# Patient Record
Sex: MALE | URBAN - NONMETROPOLITAN AREA
[De-identification: names, ages, dates, MRNs, and addresses within clinical notes are randomized per-mention and may not be internally consistent; named-entity substitution may affect disease eponyms.]

---

## 2024-06-12 ENCOUNTER — OFFICE VISIT (OUTPATIENT)
Dept: URBAN - NONMETROPOLITAN AREA CLINIC 4 | Facility: CLINIC | Age: 54
End: 2024-06-12

## 2024-08-21 ENCOUNTER — OFFICE VISIT (OUTPATIENT)
Dept: URBAN - NONMETROPOLITAN AREA CLINIC 4 | Facility: CLINIC | Age: 54
End: 2024-08-21
Payer: COMMERCIAL

## 2024-08-21 ENCOUNTER — TELEPHONE ENCOUNTER (OUTPATIENT)
Dept: URBAN - METROPOLITAN AREA CLINIC 54 | Facility: CLINIC | Age: 54
End: 2024-08-21

## 2024-08-21 ENCOUNTER — DASHBOARD ENCOUNTERS (OUTPATIENT)
Age: 54
End: 2024-08-21

## 2024-08-21 VITALS
HEART RATE: 73 BPM | DIASTOLIC BLOOD PRESSURE: 105 MMHG | WEIGHT: 216 LBS | HEIGHT: 74 IN | TEMPERATURE: 98.6 F | BODY MASS INDEX: 27.72 KG/M2 | SYSTOLIC BLOOD PRESSURE: 156 MMHG

## 2024-08-21 DIAGNOSIS — K76.0 METABOLIC DYSFUNCTION-ASSOCIATED STEATOTIC LIVER DISEASE (MASLD): ICD-10-CM

## 2024-08-21 PROCEDURE — 99204 OFFICE O/P NEW MOD 45 MIN: CPT | Performed by: INTERNAL MEDICINE

## 2024-08-21 RX ORDER — LEVOTHYROXINE SODIUM 88 UG/1
1 TABLET IN THE MORNING ON AN EMPTY STOMACH TABLET ORAL ONCE A DAY
Status: ACTIVE | COMMUNITY

## 2024-08-21 RX ORDER — TESTOSTERONE 16.2 MG/G
1 PACKET TO SKIN IN THE MORNING TO SHOULDER AND UPPER ARMS GEL TRANSDERMAL ONCE A DAY
Status: ACTIVE | COMMUNITY

## 2024-08-21 RX ORDER — SEMAGLUTIDE 1 MG/.5ML
0.5 ML INJECTION, SOLUTION SUBCUTANEOUS
Status: ACTIVE | COMMUNITY

## 2024-08-21 NOTE — HPI-TODAY'S VISIT:
Patient is a 55 yo man referred by Dr. Timothy Hodge for above reasons. A copy of this document will be sent to referring provider. He carries a diagnosis of MASLD. Risk factors include HLD, HTN, obesity. He denies abuse of acohol or tobacco. He has a family history of fatty liver or cirrhosis. LFTs on 6/3/24: TB 1.0, AST 48, ALT 73, AP 81. . Fibrosure 0.7. He has never had a liver biopsy. He has been on Wegovy 1 mg x 6 months with 25 lbs weight loss achieved. 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

## 2025-02-18 ENCOUNTER — LAB OUTSIDE AN ENCOUNTER (OUTPATIENT)
Dept: URBAN - NONMETROPOLITAN AREA CLINIC 4 | Facility: CLINIC | Age: 55
End: 2025-02-18

## 2025-02-18 ENCOUNTER — TELEPHONE ENCOUNTER (OUTPATIENT)
Dept: URBAN - NONMETROPOLITAN AREA CLINIC 4 | Facility: CLINIC | Age: 55
End: 2025-02-18

## 2025-02-18 PROBLEM — 1231824009: Status: ACTIVE | Noted: 2025-02-18

## 2025-03-21 ENCOUNTER — TELEPHONE ENCOUNTER (OUTPATIENT)
Dept: URBAN - NONMETROPOLITAN AREA CLINIC 4 | Facility: CLINIC | Age: 55
End: 2025-03-21

## 2025-04-16 ENCOUNTER — OFFICE VISIT (OUTPATIENT)
Dept: URBAN - NONMETROPOLITAN AREA CLINIC 4 | Facility: CLINIC | Age: 55
End: 2025-04-16
Payer: COMMERCIAL

## 2025-04-16 DIAGNOSIS — K74.00 LIVER FIBROSIS: ICD-10-CM

## 2025-04-16 DIAGNOSIS — K76.0 METABOLIC DYSFUNCTION-ASSOCIATED STEATOTIC LIVER DISEASE (MASLD): ICD-10-CM

## 2025-04-16 PROBLEM — 62484002: Status: ACTIVE | Noted: 2025-04-16

## 2025-04-16 PROCEDURE — 99214 OFFICE O/P EST MOD 30 MIN: CPT | Performed by: INTERNAL MEDICINE

## 2025-04-16 RX ORDER — LEVOTHYROXINE SODIUM 88 UG/1
1 TABLET IN THE MORNING ON AN EMPTY STOMACH TABLET ORAL ONCE A DAY
Status: ACTIVE | COMMUNITY

## 2025-04-16 RX ORDER — SEMAGLUTIDE 1 MG/.5ML
0.5 ML INJECTION, SOLUTION SUBCUTANEOUS
Status: DISCONTINUED | COMMUNITY

## 2025-04-16 RX ORDER — TESTOSTERONE 16.2 MG/G
1 PACKET TO SKIN IN THE MORNING TO SHOULDER AND UPPER ARMS GEL TRANSDERMAL ONCE A DAY
Status: ACTIVE | COMMUNITY

## 2025-04-16 NOTE — HPI-TODAY'S VISIT:
Patient is a 53 yo man referred by Dr. Timothy Hodge for above reasons. A copy of this document will be sent to referring provider. He carries a diagnosis of MASLD. Risk factors include HLD, HTN, obesity. He denies abuse of acohol or tobacco. He has a family history of fatty liver or cirrhosis. LFTs on 6/3/24: TB 1.0, AST 48, ALT 73, AP 81. . Fibrosure 0.7. He has never had a liver biopsy. He has been on Wegovy 1 mg x 6 months with 25 lbs weight loss achieved. 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  Follow Up 4/16/25: Patient presents for routine follow up. He stopped Wegovy in Sep 2024 due to nausea and vomiting. He has seen Dr. Dominguez and been advised to start Zepbound. 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 He has regained most of his last weight. No signs of chronic liver disease. No new complaints.